# Patient Record
(demographics unavailable — no encounter records)

---

## 2024-10-15 NOTE — COUNSELING
[Normal Weight - ( BMI  <25 )] : normal weight - ( BMI  <25 ) [Continue diet as tolerated] : continue diet as tolerated based on goals of care [Non - Smoker] : non-smoker [Smoke/CO Detectors] : smoke/CO detectors [Use grab bars] : use grab bars [Remove clutter and unsafe carpeting to avoid falls] : remove clutter and unsafe carpeting to avoid falls [] : abdominal aortic ultrasound [Decrease hospital use] : decrease hospital use [Minimize unnecessary interventions] : minimize unnecessary interventions [Maintain functional ability] : maintain functional ability [Discussed disease trajectory with patient/caregiver] : discussed disease trajectory with patient/caregiver [Likely to achieve goals/desired outcomes] : likely to achieve goals/desired outcomes [Patient/Caregiver has ___ understanding of disease process] : patient/caregiver has [unfilled] understanding of disease process [Advanced Directives discussed: ____] : Advanced directives discussed: [unfilled] [Completed Medical Orders for Life-Sustaining Treatment] : completed medical orders for life-sustaining treatment [DNR] : Code Status: DNR [Limited] : Treatment Guidelines: Limited [DNI] : Intubation: DNI [[facility]: _____] [facility]last [facility]: [unfilled] [_____] : HCP: [unfilled]

## 2024-10-15 NOTE — REASON FOR VISIT
[Follow-Up] : a follow-up visit [Spouse] : spouse [Pre-Visit Preparation] : pre-visit preparation was done [Intercurrent Specialty/Sub-specialty Visits] : the patient has intercurrent specialty/sub-specialty visits [FreeTextEntry1] : CVA [FreeTextEntry2] : chart review [FreeTextEntry3] : Neuro, cardiology, urology (prn)

## 2024-10-15 NOTE — HISTORY OF PRESENT ILLNESS
[Patient] : patient [Spouse] : spouse [FreeTextEntry1] : cva [FreeTextEntry2] : PMH CAD, CVA with R side deficits and aphasia, Seizures, HTN, vasovagal syncope, afib, HLD, hx staghorn calculi. Accompanied by wife rasta (former nurse).   Interval events: no events.    Vision: Reading glasses, poor peripheral vision of R eye since stroke Hearing: one ear is louder than the other - hearing ok Gait/Falls/Assisted devices: Wheelchair bound . No falls Skin: Lesion on forehead, no other rashes or wounds Pain: none Appetite: good BM: regular Urine: ok Sleep: "very good" Mood: anxious out of his comfort zone. no depression   Active medical problems: #CAD/ CVA with R side deficits and aphasia - was in clinical trials. - following with neuro, on ASA 81mg, statin, repatha 140mg/ml every 2 weeks on Wed. Does not want PT.  #Seizures - x2, on keppra 500mg BID, following with neuro #HTN/vasovagal syncope - very labile bp to medication and stress. Following with Cardiology. Losartan 25mg BID #Afib - following with Cardiology, eliquis 5mg BID #HLD - following with cardiology, pravastatin 20mg daily, repatha 140mg/ml every 2 weeks on Wed #h/o saghorn calculi   Social: Lives with Wife and daughter (21). No other children HHA: none HCP: Wife is HCP MOLST: DNR/DNI ok with NIMV, hosp, yes IVF and abx, determine HD and no FT  Follows with: Former PCP - Dr Jolene Alfonso ([facility]) Neurology - Dr Harry Siegel Cardiology - Dr Devon Aguiar ([facility]) Urology (prn)

## 2024-10-15 NOTE — ASSESSMENT
[FreeTextEntry1] : updated MOLST form stop allopurinol - discussed risks and benefits and pt and wife opt to try stopping. If pt has gout flair (no hx of gout, reviewed s/s of gout flair) Ice immediately and call house calls to reinstate  Next visit: keppra levels

## 2024-10-15 NOTE — HEALTH RISK ASSESSMENT
[HRA Reviewed] : Health risk assessment reviewed [Independent] : feeding [Some assistance needed] : transferring [Full assistance needed] : managing finances [No falls in past year] : Patient reported no falls in the past year [Yes] : The patient does have visual impairment [TimeGetUpGo] : 0

## 2024-10-15 NOTE — PHYSICAL EXAM
[No Acute Distress] : no acute distress [Normal Sclera/Conjunctiva] : normal sclera/conjunctiva [EOMI] : extra ocular movement intact [Normal Outer Ear/Nose] : the ears and nose were normal in appearance [Normal Oropharynx] : the oropharynx was normal [No Respiratory Distress] : no respiratory distress [Clear to Auscultation] : lungs were clear to auscultation bilaterally [No Accessory Muscle Use] : no accessory muscle use [Normal Rate] : heart rate was normal  [Regular Rhythm] : with a regular rhythm [Normal S1, S2] : normal S1 and S2 [No Murmurs] : no murmurs heard [No Edema] : there was no peripheral edema [Normal Bowel Sounds] : normal bowel sounds [Non Tender] : non-tender [Soft] : abdomen soft [Not Distended] : not distended [Normal Post Cervical Nodes] : no posterior cervical lymphadenopathy [Normal Anterior Cervical Nodes] : no anterior cervical lymphadenopathy [No Spinal Tenderness] : no spinal tenderness [No Clubbing, Cyanosis] : no clubbing  or cyanosis of the fingernails [Normal Affect] : the affect was normal [Normal Mood] : the mood was normal [Normal Insight/Judgement] : insight and judgment were intact [de-identified] : R sided deficits [de-identified] : forehead lesion [de-identified] : R sided deficits and aphasia

## 2025-05-06 NOTE — HISTORY OF PRESENT ILLNESS
[Patient] : patient [Spouse] : spouse [FreeTextEntry1] : cva [FreeTextEntry2] : PMH CAD, CVA with R side deficits and aphasia, Seizures, HTN, vasovagal syncope, afib, HLD, hx staghorn calculi. Accompanied by wife rasta (former nurse), seen for f/u.  Interval events:  Occ a fib episodes. Last episode 1.5 months ago, gets pale, HR is >100 sometimes, lasts 2 hrs.  Previously had tried various meds, would cause low BP or other issues. Even 12.5 metoprolol prn was an issue. HTN: 118/68 - 158/88, BID losartan works best for him.   Vision: Reading glasses, poor peripheral vision of R eye since stroke Hearing: one ear is louder than the other - hearing ok Gait/Falls/Assisted devices: Wheelchair bound. Now has difficulty ambulating, had PT. Fear of falling. Skin: Lesion on forehead, no other rashes or wounds. Lesion has improved but he still picks at it. Present x~ 8 months. Hx of basal cell ca on back.  He does not see a dermatologist regularly.  It was elevated, not change in size. Pain: none, none Appetite: good, no problems with swallowing. BM: regular, pt gets anxious about being able to go. Urine: ok Sleep: "very good" Mood: anxious out of his comfort zone. no depression. Pt doesn't want therapy or meds.   Active medical problems: #CAD/ CVA with R side deficits and aphasia - was in clinical trials. following with neuro, on ASA 81mg, statin, repatha 140mg/ml every 2 weeks on Wed. Still does not want PT. Had from Jan 2023-Feb 2024, felt fear was primary limitation. Wife asks about A1C. #Carotid EA hx - does not want additional testing #Seizures - x2, on keppra 500mg BID, following with neuro #HTN/vasovagal syncope - very labile bp to medication and stress. Following with Cardiology. Losartan 25mg BID. Per wife, BP gets high when he is anxious. #HLD - following with cardiology, pravastatin 20mg daily, repatha 140mg/ml every 2 weeks on Wed #h/o staghorn calculi   Social: Lives with Wife and daughter (21). No other children HHA: none HCP: Wife is HCP MOLST: DNR/DNI ok with NIMV, hosp, yes IVF and abx, determine HD and no FT  Follows with: Former PCP - Dr Jolene Alfonso ([facility]) Neurology - Dr Harry Siegel Cardiology - Dr Devon Aguiar ([facility]) Urology (prn)

## 2025-05-06 NOTE — PHYSICAL EXAM
[No Acute Distress] : no acute distress [Normal Sclera/Conjunctiva] : normal sclera/conjunctiva [EOMI] : extra ocular movement intact [Normal Outer Ear/Nose] : the ears and nose were normal in appearance [Normal Oropharynx] : the oropharynx was normal [No Respiratory Distress] : no respiratory distress [Clear to Auscultation] : lungs were clear to auscultation bilaterally [No Accessory Muscle Use] : no accessory muscle use [Normal Rate] : heart rate was normal  [Regular Rhythm] : with a regular rhythm [Normal S1, S2] : normal S1 and S2 [No Murmurs] : no murmurs heard [No Edema] : there was no peripheral edema [Normal Bowel Sounds] : normal bowel sounds [Non Tender] : non-tender [Soft] : abdomen soft [Not Distended] : not distended [Normal Post Cervical Nodes] : no posterior cervical lymphadenopathy [No Clubbing, Cyanosis] : no clubbing  or cyanosis of the fingernails [Normal Affect] : the affect was normal [Normal Mood] : the mood was normal [Normal Insight/Judgement] : insight and judgment were intact [de-identified] : sitting on couch, answers Q's with 1-2 words, sounds, L hand gestures [de-identified] : R sided deficits [de-identified] : forehead lesion (see image) [de-identified] : R sided deficits and aphasia

## 2025-05-06 NOTE — COUNSELING
[Normal Weight - ( BMI  <25 )] : normal weight - ( BMI  <25 ) [Continue diet as tolerated] : continue diet as tolerated based on goals of care [Non - Smoker] : non-smoker [Smoke/CO Detectors] : smoke/CO detectors [Use grab bars] : use grab bars [Remove clutter and unsafe carpeting to avoid falls] : remove clutter and unsafe carpeting to avoid falls [] : abdominal aortic ultrasound [Decrease hospital use] : decrease hospital use [Minimize unnecessary interventions] : minimize unnecessary interventions [Maintain functional ability] : maintain functional ability [Discussed disease trajectory with patient/caregiver] : discussed disease trajectory with patient/caregiver [Likely to achieve goals/desired outcomes] : likely to achieve goals/desired outcomes [Patient/Caregiver has ___ understanding of disease process] : patient/caregiver has [unfilled] understanding of disease process [Advanced Directives discussed: ____] : Advanced directives discussed: [unfilled] [Completed Medical Orders for Life-Sustaining Treatment] : completed medical orders for life-sustaining treatment [DNR] : Code Status: DNR [Limited] : Treatment Guidelines: Limited [DNI] : Intubation: DNI [[facility]: _____[facility] [unfilled] [_____] : HCP: [unfilled]